# Patient Record
Sex: MALE | Race: BLACK OR AFRICAN AMERICAN | NOT HISPANIC OR LATINO | URBAN - METROPOLITAN AREA
[De-identification: names, ages, dates, MRNs, and addresses within clinical notes are randomized per-mention and may not be internally consistent; named-entity substitution may affect disease eponyms.]

---

## 2022-08-15 ENCOUNTER — AMBULATORY SURGICAL CENTER (OUTPATIENT)
Dept: URBAN - METROPOLITAN AREA SURGERY 2 | Facility: SURGERY | Age: 52
End: 2022-08-15
Payer: OTHER GOVERNMENT

## 2022-08-15 ENCOUNTER — OFFICE (OUTPATIENT)
Dept: URBAN - METROPOLITAN AREA PATHOLOGY 22 | Facility: PATHOLOGY | Age: 52
End: 2022-08-15
Payer: OTHER GOVERNMENT

## 2022-08-15 VITALS
RESPIRATION RATE: 18 BRPM | OXYGEN SATURATION: 96 % | HEART RATE: 64 BPM | OXYGEN SATURATION: 95 % | WEIGHT: 195 LBS | OXYGEN SATURATION: 16 % | DIASTOLIC BLOOD PRESSURE: 95 MMHG | DIASTOLIC BLOOD PRESSURE: 100 MMHG | DIASTOLIC BLOOD PRESSURE: 100 MMHG | SYSTOLIC BLOOD PRESSURE: 124 MMHG | OXYGEN SATURATION: 99 % | OXYGEN SATURATION: 96 % | TEMPERATURE: 98 F | HEART RATE: 66 BPM | SYSTOLIC BLOOD PRESSURE: 124 MMHG | WEIGHT: 195 LBS | TEMPERATURE: 98 F | HEART RATE: 69 BPM | OXYGEN SATURATION: 16 % | RESPIRATION RATE: 16 BRPM | HEART RATE: 66 BPM | RESPIRATION RATE: 18 BRPM | OXYGEN SATURATION: 96 % | SYSTOLIC BLOOD PRESSURE: 124 MMHG | DIASTOLIC BLOOD PRESSURE: 74 MMHG | HEART RATE: 66 BPM | OXYGEN SATURATION: 99 % | OXYGEN SATURATION: 95 % | DIASTOLIC BLOOD PRESSURE: 95 MMHG | SYSTOLIC BLOOD PRESSURE: 126 MMHG | HEIGHT: 67 IN | TEMPERATURE: 98 F | HEART RATE: 64 BPM | DIASTOLIC BLOOD PRESSURE: 74 MMHG | HEART RATE: 57 BPM | SYSTOLIC BLOOD PRESSURE: 119 MMHG | SYSTOLIC BLOOD PRESSURE: 135 MMHG | DIASTOLIC BLOOD PRESSURE: 75 MMHG | DIASTOLIC BLOOD PRESSURE: 74 MMHG | HEART RATE: 57 BPM | OXYGEN SATURATION: 16 % | OXYGEN SATURATION: 99 % | DIASTOLIC BLOOD PRESSURE: 100 MMHG | SYSTOLIC BLOOD PRESSURE: 126 MMHG | SYSTOLIC BLOOD PRESSURE: 135 MMHG | HEIGHT: 67 IN | SYSTOLIC BLOOD PRESSURE: 119 MMHG | SYSTOLIC BLOOD PRESSURE: 126 MMHG | RESPIRATION RATE: 16 BRPM | DIASTOLIC BLOOD PRESSURE: 75 MMHG | HEART RATE: 69 BPM | WEIGHT: 195 LBS | RESPIRATION RATE: 16 BRPM | HEIGHT: 67 IN | DIASTOLIC BLOOD PRESSURE: 95 MMHG | SYSTOLIC BLOOD PRESSURE: 119 MMHG | OXYGEN SATURATION: 95 % | SYSTOLIC BLOOD PRESSURE: 135 MMHG | RESPIRATION RATE: 18 BRPM | HEART RATE: 57 BPM | HEART RATE: 69 BPM | DIASTOLIC BLOOD PRESSURE: 75 MMHG | HEART RATE: 64 BPM

## 2022-08-15 DIAGNOSIS — D12.4 BENIGN NEOPLASM OF DESCENDING COLON: ICD-10-CM

## 2022-08-15 DIAGNOSIS — K57.30 DIVERTICULOSIS OF LARGE INTESTINE WITHOUT PERFORATION OR ABS: ICD-10-CM

## 2022-08-15 DIAGNOSIS — Z12.11 ENCOUNTER FOR SCREENING FOR MALIGNANT NEOPLASM OF COLON: ICD-10-CM

## 2022-08-15 DIAGNOSIS — D12.5 BENIGN NEOPLASM OF SIGMOID COLON: ICD-10-CM

## 2022-08-15 PROBLEM — K63.5 POLYP OF COLON: Status: ACTIVE | Noted: 2022-08-15

## 2022-08-15 PROBLEM — K64.0 FIRST DEGREE HEMORRHOIDS: Status: ACTIVE | Noted: 2022-08-15

## 2022-08-15 PROCEDURE — 45380 COLONOSCOPY AND BIOPSY: CPT | Mod: 33 | Performed by: INTERNAL MEDICINE

## 2022-08-15 PROCEDURE — 88305 TISSUE EXAM BY PATHOLOGIST: CPT | Performed by: PATHOLOGY

## 2024-10-29 ENCOUNTER — OFFICE (OUTPATIENT)
Dept: URBAN - METROPOLITAN AREA CLINIC 11 | Facility: CLINIC | Age: 54
End: 2024-10-29
Payer: OTHER GOVERNMENT

## 2024-10-29 VITALS
OXYGEN SATURATION: 97 % | HEIGHT: 67 IN | WEIGHT: 204 LBS | DIASTOLIC BLOOD PRESSURE: 112 MMHG | DIASTOLIC BLOOD PRESSURE: 113 MMHG | HEART RATE: 88 BPM | SYSTOLIC BLOOD PRESSURE: 150 MMHG | SYSTOLIC BLOOD PRESSURE: 151 MMHG

## 2024-10-29 DIAGNOSIS — K21.9 GASTRO-ESOPHAGEAL REFLUX DISEASE WITHOUT ESOPHAGITIS: ICD-10-CM

## 2024-10-29 DIAGNOSIS — R13.10 DYSPHAGIA, UNSPECIFIED: ICD-10-CM

## 2024-10-29 PROCEDURE — 99214 OFFICE O/P EST MOD 30 MIN: CPT | Performed by: NURSE PRACTITIONER

## 2024-10-29 RX ORDER — OMEPRAZOLE 40 MG/1
40 CAPSULE, DELAYED RELEASE ORAL
Qty: 90 | Refills: 2 | Status: ACTIVE

## 2024-10-31 PROBLEM — K31.89 OTHER DISEASES OF STOMACH AND DUODENUM: Status: ACTIVE | Noted: 2024-10-31

## 2024-10-31 PROBLEM — K29.70 GASTRITIS, UNSPECIFIED, WITHOUT BLEEDING: Status: ACTIVE | Noted: 2024-10-31

## 2025-02-04 ENCOUNTER — OFFICE (OUTPATIENT)
Dept: URBAN - METROPOLITAN AREA CLINIC 11 | Facility: CLINIC | Age: 55
End: 2025-02-04
Payer: OTHER GOVERNMENT

## 2025-02-04 VITALS
DIASTOLIC BLOOD PRESSURE: 95 MMHG | HEIGHT: 68 IN | WEIGHT: 200 LBS | HEART RATE: 71 BPM | OXYGEN SATURATION: 96 % | SYSTOLIC BLOOD PRESSURE: 129 MMHG

## 2025-02-04 DIAGNOSIS — A04.8 OTHER SPECIFIED BACTERIAL INTESTINAL INFECTIONS: ICD-10-CM

## 2025-02-04 DIAGNOSIS — K21.9 GASTRO-ESOPHAGEAL REFLUX DISEASE WITHOUT ESOPHAGITIS: ICD-10-CM

## 2025-02-04 DIAGNOSIS — R13.10 DYSPHAGIA, UNSPECIFIED: ICD-10-CM

## 2025-02-04 PROCEDURE — 99214 OFFICE O/P EST MOD 30 MIN: CPT | Performed by: NURSE PRACTITIONER

## 2025-02-04 RX ORDER — OMEPRAZOLE 40 MG/1
CAPSULE, DELAYED RELEASE ORAL
Qty: 90 | Refills: 2 | Status: ACTIVE

## 2025-02-04 RX ORDER — DOXYCYCLINE 100 MG/1
200 TABLET, FILM COATED ORAL
Qty: 28 | Refills: 0 | Status: ACTIVE
Start: 2025-02-04

## 2025-02-04 RX ORDER — BISMUTH SUBSALICYLATE 262 MG/1
2096 TABLET, CHEWABLE ORAL
Qty: 112 | Refills: 0 | Status: ACTIVE
Start: 2025-02-04

## 2025-02-04 RX ORDER — METRONIDAZOLE 250 MG/1
1000 TABLET, FILM COATED ORAL
Qty: 56 | Refills: 0 | Status: ACTIVE
Start: 2025-02-04

## 2025-02-04 NOTE — SERVICEHPINOTES
Mr. Vick is a 54 year old male  here today complaints of GERD, dysphagia, odynophagia.  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  He states for the last 5 years he has had intermittent episodes of dysphagia, odynophagia, GERD.   Initially, he changed his diet and cut out known trigger foods and his symptoms improved.   However, these episodes have become more frequent and he has found that it is exacerbated by stress.   He is currently on omeprazole 20 mg daily but continues to have episodes of difficulty swallowing and feeling like he has a lump in his throat.   His symptoms usually start after he encounters a stressful situation at work.   [place] needs to follow up with GI.   He denies any nausea or vomiting.  He had a colonoscopy on 08/15/2022 that showed moderate diverticulosis, grade 1 internal / external hemorrhoids and 6 tubular adenomas. 
br
br In follow up on 2/4/25,  he had EGD on 10/31/2024 that showed gastritis and pathology was positive for H pylori and quad therapy was sent to his pharmacy.  He states he was unaware that the H pylori treatment was sent to his pharmacy and he has not taken it.    He continues to have intermittent episodes of heartburn, reflux, nausea, difficulty swallowing.   He continues on omeprazole 40 mg daily and has had a little improvement in symptoms but knows that stress, anxiety from his job is what triggers it.  He states he can not eliminate the stress from his job.

## 2025-02-04 NOTE — SERVICENOTES
he was unaware of the H pylori treatment that was sent to his pharmacy back in November.   We will resend medications and check H pylori breath test at his follow-up in 2 months.  He is aware to stop his omeprazole 2 weeks prior to the appointment.

## 2025-04-09 ENCOUNTER — OFFICE (OUTPATIENT)
Dept: URBAN - METROPOLITAN AREA CLINIC 11 | Facility: CLINIC | Age: 55
End: 2025-04-09
Payer: OTHER GOVERNMENT

## 2025-04-09 VITALS
OXYGEN SATURATION: 98 % | DIASTOLIC BLOOD PRESSURE: 96 MMHG | SYSTOLIC BLOOD PRESSURE: 136 MMHG | DIASTOLIC BLOOD PRESSURE: 101 MMHG | HEIGHT: 68 IN | SYSTOLIC BLOOD PRESSURE: 164 MMHG | WEIGHT: 192 LBS | HEART RATE: 63 BPM

## 2025-04-09 DIAGNOSIS — K21.9 GASTRO-ESOPHAGEAL REFLUX DISEASE WITHOUT ESOPHAGITIS: ICD-10-CM

## 2025-04-09 DIAGNOSIS — R13.10 DYSPHAGIA, UNSPECIFIED: ICD-10-CM

## 2025-04-09 DIAGNOSIS — A04.8 OTHER SPECIFIED BACTERIAL INTESTINAL INFECTIONS: ICD-10-CM

## 2025-04-09 DIAGNOSIS — K64.0 FIRST DEGREE HEMORRHOIDS: ICD-10-CM

## 2025-04-09 PROCEDURE — 99214 OFFICE O/P EST MOD 30 MIN: CPT | Performed by: NURSE PRACTITIONER

## 2025-04-09 RX ORDER — HYDROCORTISONE ACETATE 25 MG/1
50 SUPPOSITORY RECTAL
Qty: 30 | Refills: 6 | Status: ACTIVE
Start: 2025-04-09

## 2025-04-09 NOTE — SERVICEHPINOTES
Mr. Vick is a 54 year old male  here today complaints of GERD, dysphagia, odynophagia.  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  He states for the last 5 years he has had intermittent episodes of dysphagia, odynophagia, GERD.   Initially, he changed his diet and cut out known trigger foods and his symptoms improved.   However, these episodes have become more frequent and he has found that it is exacerbated by stress.   He is currently on omeprazole 20 mg daily but continues to have episodes of difficulty swallowing and feeling like he has a lump in his throat.   His symptoms usually start after he encounters a stressful situation at work.   [place] was told he needs to follow up with GI.   He denies any nausea or vomiting.  He had a colonoscopy on 08/15/2022 that showed moderate diverticulosis, grade 1 internal / external hemorrhoids and 6 tubular adenomas.In follow up on 2/4/25,  he had EGD on 10/31/2024 that showed gastritis and pathology was positive for H pylori and quad therapy was sent to his pharmacy.  He states he was unaware that the H pylori treatment was sent to his pharmacy and he has not taken it.    He continues to have intermittent episodes of heartburn, reflux, nausea, difficulty swallowing.   He continues on omeprazole 40 mg daily and has had a little improvement in symptoms but knows that stress, anxiety from his job is what triggers it.  He states he can not eliminate the stress from his job.  
hugo arias  In follow-up on 04/09/2025,   He completed the quadruple therapy for the H pylori.  His upper GI symptoms have improved.  He noticed after completing the medications that he can now tolerate  dairy products.  He has been using Lactaid free products for years. He has grade 1 internal / external hemorrhoids   And they were exacerbated after taking the bismuth.  He states the medication gave him  slight diarrhea which exacerbated his hemorrhoids.   [facility] and was [facility] for topical hydrocortisone cream and this has somewhat helped.   He denies any nausea, vomiting, abdominal pain.

## 2025-04-09 NOTE — SERVICENOTES
we will get repeat H pylori breath test today.  He has had an exacerbation of his hemorrhoids recently.  Hydrocortisone cream does help.  Will try him on suppositories as he does have internal hemorrhoids as well.  Discussed referral to Colorectal surgery but he would like to hold off and time.   Discussed using stool softeners when he started having hard stools to help prevent exacerbation of hemorrhoids.

## 2025-04-12 LAB
H PYLORI BREATH TEST: NEGATIVE
H. PYLORI BREATH COLLECTION: (no result)